# Patient Record
(demographics unavailable — no encounter records)

---

## 2025-04-22 NOTE — PHYSICAL EXAM
[Restricted in physically strenuous activity but ambulatory and able to carry out work of a light or sedentary nature] : Status 1- Restricted in physically strenuous activity but ambulatory and able to carry out work of a light or sedentary nature, e.g., light house work, office work [Obese] : obese [Normal] : full range of motion and no deformities appreciated

## 2025-04-25 NOTE — HISTORY OF PRESENT ILLNESS
[de-identified] : Ms. Sierra Romero is 38 year old with portal vein and SMV thrombosis here for consultation. \par  \par  Patient with hx of gastric sleeve 4/18/2023 (Dr. Jan Roach) , went into [facility] on 4/22/2023 for abdominal pain with normal CT Scan, returned on 4/30/2023 for worsening of pain with repeated CT scan noted for pain on 4/30/2023 and found to have acute pancreatitis and portal vein and SMV thromobosis.  She is now on Lovenox 120 mg bid. \par  \par  CT A/P (4/30/23)\par  -extensive thrombus involving the protal vein and SMV, increased in extent since prior CT 4/23/2023 where it did not involve the SMV\par  -ill defined stranding surrounding the pancreas, increase since prior - likely acute pancreatitis\par  \par  She is currently receiving feeding via IV line  and abdominal much improved. 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

## 2025-04-25 NOTE — HISTORY OF PRESENT ILLNESS
[de-identified] : Ms. Sierra Romero is 38 year old with portal vein and SMV thrombosis here for consultation. \par  \par  Patient with hx of gastric sleeve 4/18/2023 (Dr. Jan Roach) , went into [facility] on 4/22/2023 for abdominal pain with normal CT Scan, returned on 4/30/2023 for worsening of pain with repeated CT scan noted for pain on 4/30/2023 and found to have acute pancreatitis and portal vein and SMV thromobosis.  She is now on Lovenox 120 mg bid. \par  \par  CT A/P (4/30/23)\par  -extensive thrombus involving the protal vein and SMV, increased in extent since prior CT 4/23/2023 where it did not involve the SMV\par  -ill defined stranding surrounding the pancreas, increase since prior - likely acute pancreatitis\par  \par  She is currently receiving feeding via IV line  and abdominal much improved. 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

## 2025-04-25 NOTE — ASSESSMENT
[FreeTextEntry1] : ## Extensive thrombus involving portal vein, SMV. Acute pancreatitis Work up showed heterozygous prothrombin gene mutation Family History of prothrombin gene mutation Had gastric sleeve surgery [facility] (4/18/23) Started having symptoms 4/22/23-> pain with oral intake. CUrrently NPO and on TPN Provoked by gastric sleeve surgery CT: (4/30/23) - Extensive thrombus involving portal vein, SMV. Acute pancreatitis Father - PE, P cousin- DVT, P aunt- PE Personal history of miscarriages- 2, 2nd trimester and 1st trimester Has a 4 year old No Family HO miscarriages Smoking- Never OC pills- Never Current Anticoagulation/ Antiplatelet therapy: Lovenox 120 mg BID (4/30/23 - 6/1/23), switched to eliquis since June 2 2023. Completed October 2023 Repeated CT scan in 12/2023 noted for resolution of clot --6/18/2024 CT: Cavernous transformation of the portal vein with no definite evidence of intraluminal thrombus within the varices or right or left portal vein. No change since the prior exam. Indeterminate area changes area of enhancement in the left hepatic lobe,  -She's clinically doing well  Reiterated on signs and symptoms of VTEs.  -Labs are drawn in the office, reviewed, analyzed, and discussed To continue to monitor and advised to follow up if she gets pregnant.   ## Iron def s/p IV iron in Oct 2023 H/H is acceptable, Ferritin pending - to give Ferritin prn.   Patient had multiple questions which were answered to satisfaction  d/w Dr. Aguirre  To follow up prn or when she's pregnant.  CBC, CMP, Ferritin, iron panel, D dimer.

## 2025-04-25 NOTE — RESULTS/DATA
[FreeTextEntry1] : Labs reviewed analyzed and discussed  Prothrombin gene mutation- Heterozygous  AT3- Low-> normal Protein S- Low -> normal

## 2025-07-17 NOTE — BEGINNING OF VISIT
[0] : 2) Feeling down, depressed, or hopeless: Not at all (0) [PHQ-2 Negative] : PHQ-2 Negative [PHQ-9 Negative] : PHQ-9 Negative [Former] : Former [[id number]] : 0

## 2025-07-17 NOTE — HISTORY OF PRESENT ILLNESS
[de-identified] : Ms. Sierra Romero is 38 year old with portal vein and SMV thrombosis here for consultation. \par  \par  Patient with hx of gastric sleeve 4/18/2023 (Dr. Jan Roach) , went into [facility] on 4/22/2023 for abdominal pain with normal CT Scan, returned on 4/30/2023 for worsening of pain with repeated CT scan noted for pain on 4/30/2023 and found to have acute pancreatitis and portal vein and SMV thromobosis.  She is now on Lovenox 120 mg bid. \par  \par  CT A/P (4/30/23)\par  -extensive thrombus involving the protal vein and SMV, increased in extent since prior CT 4/23/2023 where it did not involve the SMV\par  -ill defined stranding surrounding the pancreas, increase since prior - likely acute pancreatitis\par  \par  She is currently receiving feeding via IV line  and abdominal much improved. 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 Patient was seen by us in April by Pierre OCASIO Patient is not on any blood thinning agents and is mainly here for clearance.

## 2025-07-17 NOTE — HISTORY OF PRESENT ILLNESS
[de-identified] : Ms. Sierra Romero is 38 year old with portal vein and SMV thrombosis here for consultation. \par  \par  Patient with hx of gastric sleeve 4/18/2023 (Dr. Jan Roach) , went into [facility] on 4/22/2023 for abdominal pain with normal CT Scan, returned on 4/30/2023 for worsening of pain with repeated CT scan noted for pain on 4/30/2023 and found to have acute pancreatitis and portal vein and SMV thromobosis.  She is now on Lovenox 120 mg bid. \par  \par  CT A/P (4/30/23)\par  -extensive thrombus involving the protal vein and SMV, increased in extent since prior CT 4/23/2023 where it did not involve the SMV\par  -ill defined stranding surrounding the pancreas, increase since prior - likely acute pancreatitis\par  \par  She is currently receiving feeding via IV line  and abdominal much improved. 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 Patient was seen by us in April by Pierre OCASIO Patient is not on any blood thinning agents and is mainly here for clearance.

## 2025-07-17 NOTE — ASSESSMENT
[FreeTextEntry1] : Preop clearance Scheduled for "Unclogging fallopian tube" procedure tomorrow. Paperwork not available Patient was not advised any heme clearance but she wanted to make sure given her prior history of thrombosis and PTG mutation Reports it is a same day procedure and she has no restriction post procedure She is cleared for the planned procedure from hematology stand point.  Consider prophylactic anticoagulation if she requires hospitalization or immobilization post procedure  ## Extensive thrombus involving portal vein, SMV. Acute pancreatitis Work up showed heterozygous prothrombin gene mutation Family History of prothrombin gene mutation Had gastric sleeve surgery [facility] (4/18/23) Started having symptoms 4/22/23-> pain with oral intake. CUrrently NPO and on TPN Provoked by gastric sleeve surgery CT: (4/30/23) - Extensive thrombus involving portal vein, SMV. Acute pancreatitis Father - PE, P cousin- DVT, P aunt- PE Personal history of miscarriages- 2, 2nd trimester and 1st trimester Has a 4 year old No Family HO miscarriages Smoking- Never OC pills- Never Current Anticoagulation/ Antiplatelet therapy: Lovenox 120 mg BID (4/30/23 - 6/1/23), switched to eliquis since June 2 2023. Completed October 2023 Repeated CT scan in 12/2023 noted for resolution of clot --6/18/2024 CT: Cavernous transformation of the portal vein with no definite evidence of intraluminal thrombus within the varices or right or left portal vein. No change since the prior exam. Indeterminate area changes area of enhancement in the left hepatic lobe,  -She's clinically doing well  Reiterated on signs and symptoms of VTEs.  Continue to monitor and advised to follow up if she gets pregnant or has any procedures/surgery.   ## Iron def s/p IV iron in Oct 2023 H/H is acceptable, Ferritin pending - to give Ferritin prn.   Patient had multiple questions which were answered to satisfaction  To follow up prn or when she's pregnant or preop for surgery

## 2025-07-17 NOTE — BEGINNING OF VISIT
[0] : 2) Feeling down, depressed, or hopeless: Not at all (0) [PHQ-2 Negative] : PHQ-2 Negative [PHQ-9 Negative] : PHQ-9 Negative [Former] : Former [[id number]] : 0

## 2025-07-17 NOTE — BEGINNING OF VISIT
[0] : 2) Feeling down, depressed, or hopeless: Not at all (0) [PHQ-2 Negative] : PHQ-2 Negative [PHQ-9 Negative] : PHQ-9 Negative [Former] : Former [[id number]] : 0

## 2025-07-17 NOTE — HISTORY OF PRESENT ILLNESS
[de-identified] : Ms. Sierra Romero is 38 year old with portal vein and SMV thrombosis here for consultation. \par  \par  Patient with hx of gastric sleeve 4/18/2023 (Dr. Jan Roach) , went into [facility] on 4/22/2023 for abdominal pain with normal CT Scan, returned on 4/30/2023 for worsening of pain with repeated CT scan noted for pain on 4/30/2023 and found to have acute pancreatitis and portal vein and SMV thromobosis.  She is now on Lovenox 120 mg bid. \par  \par  CT A/P (4/30/23)\par  -extensive thrombus involving the protal vein and SMV, increased in extent since prior CT 4/23/2023 where it did not involve the SMV\par  -ill defined stranding surrounding the pancreas, increase since prior - likely acute pancreatitis\par  \par  She is currently receiving feeding via IV line  and abdominal much improved. 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 Patient was seen by us in April by Pierre OCASIO Patient is not on any blood thinning agents and is mainly here for clearance.